# Patient Record
Sex: MALE | Race: WHITE | HISPANIC OR LATINO | Employment: FULL TIME | URBAN - METROPOLITAN AREA
[De-identification: names, ages, dates, MRNs, and addresses within clinical notes are randomized per-mention and may not be internally consistent; named-entity substitution may affect disease eponyms.]

---

## 2017-11-13 ENCOUNTER — OFFICE VISIT (OUTPATIENT)
Dept: MEDICAL GROUP | Facility: MEDICAL CENTER | Age: 37
End: 2017-11-13
Payer: COMMERCIAL

## 2017-11-13 VITALS
BODY MASS INDEX: 33.15 KG/M2 | TEMPERATURE: 97.8 F | DIASTOLIC BLOOD PRESSURE: 78 MMHG | WEIGHT: 218.7 LBS | OXYGEN SATURATION: 97 % | RESPIRATION RATE: 20 BRPM | SYSTOLIC BLOOD PRESSURE: 120 MMHG | HEIGHT: 68 IN | HEART RATE: 76 BPM

## 2017-11-13 DIAGNOSIS — F33.1 MODERATE EPISODE OF RECURRENT MAJOR DEPRESSIVE DISORDER (HCC): ICD-10-CM

## 2017-11-13 DIAGNOSIS — F40.248 FEAR OF PUBLIC SPEAKING: ICD-10-CM

## 2017-11-13 DIAGNOSIS — R41.840 INATTENTION: ICD-10-CM

## 2017-11-13 DIAGNOSIS — Z00.00 HEALTHCARE MAINTENANCE: ICD-10-CM

## 2017-11-13 DIAGNOSIS — Z23 NEED FOR INFLUENZA VACCINATION: ICD-10-CM

## 2017-11-13 DIAGNOSIS — Z23 NEED FOR VACCINATION: ICD-10-CM

## 2017-11-13 PROCEDURE — 90686 IIV4 VACC NO PRSV 0.5 ML IM: CPT | Performed by: FAMILY MEDICINE

## 2017-11-13 PROCEDURE — 90471 IMMUNIZATION ADMIN: CPT | Performed by: FAMILY MEDICINE

## 2017-11-13 PROCEDURE — 99204 OFFICE O/P NEW MOD 45 MIN: CPT | Mod: 25 | Performed by: FAMILY MEDICINE

## 2017-11-13 RX ORDER — SERTRALINE HYDROCHLORIDE 25 MG/1
25 TABLET, FILM COATED ORAL DAILY
COMMUNITY
End: 2017-11-13 | Stop reason: SDUPTHER

## 2017-11-13 RX ORDER — DEXTROAMPHETAMINE SACCHARATE, AMPHETAMINE ASPARTATE, DEXTROAMPHETAMINE SULFATE AND AMPHETAMINE SULFATE 2.5; 2.5; 2.5; 2.5 MG/1; MG/1; MG/1; MG/1
5 TABLET ORAL 2 TIMES DAILY
COMMUNITY
End: 2017-11-13

## 2017-11-13 ASSESSMENT — PATIENT HEALTH QUESTIONNAIRE - PHQ9
5. POOR APPETITE OR OVEREATING: 2 - MORE THAN HALF THE DAYS
SUM OF ALL RESPONSES TO PHQ QUESTIONS 1-9: 17
CLINICAL INTERPRETATION OF PHQ2 SCORE: 4

## 2017-11-13 NOTE — ASSESSMENT & PLAN NOTE
The patient does have a fear of public speaking. When he has to speak in public he gets a fast heart rate and anxiety. He is wondering if anything can help.

## 2017-11-13 NOTE — ASSESSMENT & PLAN NOTE
The patient has major depressive disorder. He has been maintained on Zoloft 25 mg daily for the past year. He states that this is helping but thinks he might benefit from an increased dose. He does have occasional passive thoughts that he would be be better off dead. He denies suicidal ideation to me today. He denies any history of serious suicidal ideation or suicide attempts.

## 2017-11-13 NOTE — PROGRESS NOTES
[facility]  Progress Note  New Patient    Subjective:   Tom Watson is a 36 y.o. male here today with a chief complaint of inattention. This is a new patient to me. The patient comes in alone. 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     Inattention  The patient describes symptoms of inattention, poor concentration, memory loss. He doesn't know if he had the symptoms as a child. He had tried Adderall in the past and he says this helped.    Healthcare maintenance  The patient declined lipid, glucose, HIV, gonorrhea, chlamydia, RPR screening today.     Flu vaccine: Given 11/13/17.   Tdap: says had done in past 10 years.    Moderate episode of recurrent major depressive disorder (CMS-HCC)  The patient has major depressive disorder. He has been maintained on Zoloft 25 mg daily for the past year. He states that this is helping but thinks he might benefit from an increased dose. He does have occasional passive thoughts that he would be be better off dead. He denies suicidal ideation to me today. He denies any history of serious suicidal ideation or suicide attempts.    Fear of public speaking  The patient does have a fear of public speaking. When he has to speak in public he gets a fast heart rate and anxiety. He is wondering if anything can help.      No current outpatient prescriptions on file prior to visit.     No current facility-administered medications on file prior to visit.        Past Medical History:   Diagnosis Date   • ADHD    • Depression        Allergies: Review of patient's allergies indicates no known allergies.    Surgical History:  has a past surgical history that includes appendectomy.    Family History: family history includes Hypertension in his mother.    Social History:  reports that he has never smoked. He has never used smokeless tobacco. He reports that he drinks about 1.8 oz of alcohol per week . He reports that he does not use drugs.    ROS:  "  Constitutional ROS: No unexplained fevers, sweats, or chills  Eye ROS: No eye pain, redness, discharge  Ear ROS: No ear pain  Mouth/Throat ROS: No sore throat  Neck ROS: No swollen glands  Pulmonary ROS: No shortness of breath  Cardiovascular ROS: No chest pain  Gastrointestinal ROS: No nausea, vomiting, diarrhea, or constipation  Musculoskeletal/Extremities ROS: No peripheral edema  Hematologic/Lymphatic ROS: No weight loss  Skin/Integumentary ROS: No evidence of rash  Neurologic ROS: No seizures  Psychiatric ROS: Positive for depression.       Objective:     Vitals:    11/13/17 1004   BP: 120/78   Pulse: 76   Resp: 20   Temp: 36.6 °C (97.8 °F)   SpO2: 97%   Weight: 99.2 kg (218 lb 11.1 oz)   Height: 1.727 m (5' 8\")       Physical Exam:  Constitutional: Alert, no distress.  Skin: Warm, dry, good turgor, no rashes in visible areas.  Eye: Equal, round and reactive, conjunctiva clear, lids normal.  ENMT: Lips without lesions, good dentition, oropharynx clear.  Neck: Trachea midline, no masses, no thyromegaly. No cervical or supraclavicular lymphadenopathy  Respiratory: Unlabored respiratory effort, lungs clear to auscultation, no wheezes, no ronchi.  Cardiovascular: Normal S1, S2, no murmur, no edema.  Abdomen: Soft, non-tender, no masses, no hepatosplenomegaly.  Psych: Alert and oriented, flat affect, depressed mood.     Depression Screen (PHQ-2/PHQ-9) 11/13/2017   PHQ-2 Total Score 4   PHQ-9 Total Score 17       Interpretation of PHQ-9 Total Score   Score Severity   1-4 No Depression   5-9 Mild Depression   10-14 Moderate Depression   15-19 Moderately Severe Depression   20-27 Severe Depression      Assessment and Plan:     1. Need for influenza vaccination  - INFLUENZA VACCINE QUAD INJ >3Y(PF)    2. Fear of public speaking  May consider beta blocker. I'm concerned about worsening his depression and so we will hold off for now and re-evaluate him in 1 month.     3. Moderate episode of recurrent major depressive " disorder (CMS-HCC)  No SI currently. Discussed need to go to ER with any thoughts of self-harm. Also provided suicide hotline number to patient. Will increase dose of Zoloft.   - sertraline (ZOLOFT) 50 MG Tab; Take 1 Tab by mouth every day.  Dispense: 90 Tab; Refill: 1  - REFERRAL TO PSYCHIATRY    4. Inattention  - REFERRAL TO PSYCHIATRY    5. Healthcare maintenance  - See HPI.         Followup: Return in about 4 weeks (around 12/11/2017), or if symptoms worsen or fail to improve.

## 2017-11-13 NOTE — LETTER
[facility]  Jarad Mesa M.D.  [address]  [place] [address]  Fax: [phone]   Authorization for Release/Disclosure of   Protected Health Information   Name: TOM AGUILERA : 1980 SSN: [id number]   Address: [address] [place] [address] [address]  [facility] [address] Phone:    [phone] (home)    I authorize the entity listed below to release/disclose the PHI below to:   [facility]/Jarad Mesa M.D. and Jarad Mesa M.D.   Provider or Entity Name:  Dr. Navi Casillas   Address   City, State, Zip   Phone:              [phone]    Fax:     Reason for request: continuity of care   Information to be released:    [  ] LAST COLONOSCOPY,  including any PATH REPORT and follow-up  [  ] LAST FIT/COLOGUARD RESULT [  ] LAST DEXA  [  ] LAST MAMMOGRAM  [  ] LAST PAP  [  ] LAST LABS [  ] RETINA EXAM REPORT  [  ] IMMUNIZATION RECORDS  [x  ] Release all info      [  ] Check here and initial the line next to each item to release ALL health information INCLUDING  _____ Care and treatment for drug and / or alcohol abuse  _____ HIV testing, infection status, or AIDS  _____ Genetic Testing    DATES OF SERVICE OR TIME PERIOD TO BE DISCLOSED: last 4 office visits____________  I understand and acknowledge that:  * This Authorization may be revoked at any time by you in writing, except if your health information has already been used or disclosed.  * Your health information that will be used or disclosed as a result of you signing this authorization could be re-disclosed by the recipient. If this occurs, your re-disclosed health information may no longer be protected by State or Federal laws.  * You may refuse to sign this Authorization. Your refusal will not affect your ability to obtain treatment.  * This Authorization becomes effective upon signing and will  on (date) __________.      If no date is indicated, this Authorization will  one (1) year from the signature date.    Name: Tom  Walter    Signature:   Date:     11/13/2017       PLEASE FAX REQUESTED RECORDS BACK TO: [phone]

## 2017-11-13 NOTE — ASSESSMENT & PLAN NOTE
The patient declined lipid, glucose, HIV, gonorrhea, chlamydia, RPR screening today.     Flu vaccine: Given 11/13/17.   Tdap: says had done in past 10 years.

## 2018-01-29 ENCOUNTER — OFFICE VISIT (OUTPATIENT)
Dept: BEHAVIORAL HEALTH | Facility: PHYSICIAN GROUP | Age: 38
End: 2018-01-29
Payer: COMMERCIAL

## 2018-01-29 DIAGNOSIS — F41.8 SITUATIONAL ANXIETY: ICD-10-CM

## 2018-01-29 DIAGNOSIS — F33.1 MODERATE EPISODE OF RECURRENT MAJOR DEPRESSIVE DISORDER (HCC): ICD-10-CM

## 2018-01-29 DIAGNOSIS — F90.0 ATTENTION DEFICIT HYPERACTIVITY DISORDER (ADHD), PREDOMINANTLY INATTENTIVE TYPE: ICD-10-CM

## 2018-01-29 PROBLEM — F41.1 GAD (GENERALIZED ANXIETY DISORDER): Status: ACTIVE | Noted: 2018-01-29

## 2018-01-29 PROBLEM — F90.9 ADHD: Status: ACTIVE | Noted: 2018-01-29

## 2018-01-29 PROBLEM — F40.10 SOCIAL ANXIETY DISORDER: Status: ACTIVE | Noted: 2018-01-29

## 2018-01-29 PROCEDURE — 99214 OFFICE O/P EST MOD 30 MIN: CPT | Performed by: STUDENT IN AN ORGANIZED HEALTH CARE EDUCATION/TRAINING PROGRAM

## 2018-01-29 RX ORDER — DEXTROAMPHETAMINE SACCHARATE, AMPHETAMINE ASPARTATE MONOHYDRATE, DEXTROAMPHETAMINE SULFATE AND AMPHETAMINE SULFATE 2.5; 2.5; 2.5; 2.5 MG/1; MG/1; MG/1; MG/1
10 CAPSULE, EXTENDED RELEASE ORAL EVERY MORNING
Qty: 30 CAP | Refills: 0 | Status: SHIPPED | OUTPATIENT
Start: 2018-01-29 | End: 2018-02-28

## 2018-01-29 RX ORDER — PROPRANOLOL HYDROCHLORIDE 10 MG/1
10 TABLET ORAL 2 TIMES DAILY
Qty: 60 TAB | Refills: 2 | Status: SHIPPED | OUTPATIENT
Start: 2018-01-29

## 2018-01-29 NOTE — PROGRESS NOTES
PSYCHIATRIC Evaluation:    Supervising Physician:     Dr. Chaya Dasilva    ID: 36 y/o  male with hx of adhd, anxiety, and depression, seen for new establishment visit this morning.    CURRENT PSYCHOTROPIC MEDS:  -zoloft 50mg PO QDaily for anxiety/depression    Chief Complaint: ADHD/Anxiety/Depression    HPI:     Says that he has been living in [facility] for about 1 year now, transplant from [facility], and needs to be established with psychiatry. Previously been treated for ADHD and Depression/Anxiety. Currently taking zoloft (sertraline) 50mg PO QDaily for his depression/anxiety over the last 2 years which has been significantly beneficial, says his mood is currently stable and anxiety is under control. Patient denies any recent panic attacks which he has had previous to zoloft. Says that he has not taken Adderall for about 1 year now since [place] [facility] because he has not been established with mental health- psychotropic medication currently filled by PCP ( report has been scanned in our system)     ADHD: Was seen and evaluated for ADHD symptoms with extensive review of prior history. Patient’s symptoms have been present prior to age of 12 years old with both hyperactivity and impulsivity along with inattentiveness both while in school and at home. Patient’s current symptoms as an adult have been present for >6 months: consist of failing to give close attention to details in both a work setting and home setting. Has difficulty sustaining attention for extended periods of time and has difficulty organizing tasks and activates. Patient has a long history of avoiding &/or being reluctant to tasks that require sustained mental effort such as preparing reports and reviewing tasks required for her work.Patient has difficulty with being unable to engage in leisurely activity with sustained mental effort/ distractibility in the workplace which has affected in both settings of occupational functioning and  home-life.       Psychiatric Review of Systems:current symptoms as reported by pt.  Depression:   None currently  Cindi: none  Anxiety/Panic Attacks-hx of situational anxiety and panic attacksb   PTSD symptom: none  Psychosis: none  ADHD: as stated above       Medical Review of Systems: as reported by pt. All systems reviewed. Only those found to be + are noted below. All others are negative.   Neurological:    TBIs: none   SZs: none   Strokes: none     Other medical symptoms:   Thyroid: none   Diabetes: none   Cardiovascular disease: none    Psychiatric Examination: observed phenomenon:    Musculoskeletal(abnormal movements, gait, etc): normal gait  Appearance: young male, looks stated age, appropriate questions and answers  Thoughts: linear, organized  Speech:RRR  Mood:          good  Affect:         Mood congruent  SI/HI:   Denies/denies  Attention/Alertness:   alert  Memory:    Grossly intact  Orientation:    To person, place, time, and situation intact  Fund of Knowledge:    Grossly intact  Insight/Judgement into symptoms: good/good        Past Psychiatric Hx:   -hx of adhd diagnosed in 2016  -denies hx of inpatient psychiatric hospitalizations  -denies hx of self injurious behaviors  -denies hx of suicidal attempts     PAST PSYCHOTROPIC MEDS:  zoloft prescribed since 2015   adderall ER 20mg/daily    Family Psychiatric Hx:  -none reported    Social Hx:  -born and raised in [place], moved to [facility] at 10 years old.   -3 brothers/2 sisters. Father passed away from [facility] when patient was 10 years old.   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 Only those findings of potential interest to psychiatry are noted  below:  Medical Conditions:     Past Medical History:   Diagnosis Date   • ADHD    • Depression      Allergies: Patient has no known allergies.  Medications (currently prescribed at [facility]):   No current outpatient prescriptions on file prior to visit.     No current facility-administered medications on file prior to visit.      Labs: none on file   ECG: none on file    Cranial Imaging: none on file      ASSESSMENT/PLAN:  38 y/o  male with hx of ADHD, depression, and anxiety, currently treated for mood/anxiety with sertraline (zoloft) 50mg daily, says this has been effective in controlling mood/anxiety, denies recent panic attacks. Trial adderall XR 10mg PO QDaily for adhd symptoms, will titrate as appropriate ( report does show patient was taking adderall 20mg Daily, but not recently in over 1 year as accurately reported by patient). Requesting for low dose anxiolytic for social anxiety, trial propranolol 10mg BID.     #MDD  #situational anxiety  #ADHD    -trial adderall ER 10mg PO BID  -cont.(sertraline)  zoloft 50mg PO QDaily  -trial propranolol 10 mg PO BID   -basic labs next visit  -f/u in 4 weeks, will discuss need for adderall titration at that time.

## 2018-03-19 ENCOUNTER — OFFICE VISIT (OUTPATIENT)
Dept: BEHAVIORAL HEALTH | Facility: PHYSICIAN GROUP | Age: 38
End: 2018-03-19
Payer: COMMERCIAL

## 2018-03-19 DIAGNOSIS — F90.0 ATTENTION DEFICIT HYPERACTIVITY DISORDER (ADHD), PREDOMINANTLY INATTENTIVE TYPE: Primary | ICD-10-CM

## 2018-03-19 DIAGNOSIS — F33.1 MODERATE EPISODE OF RECURRENT MAJOR DEPRESSIVE DISORDER (HCC): ICD-10-CM

## 2018-03-19 DIAGNOSIS — F41.8 SITUATIONAL ANXIETY: ICD-10-CM

## 2018-03-19 PROCEDURE — 99214 OFFICE O/P EST MOD 30 MIN: CPT | Performed by: STUDENT IN AN ORGANIZED HEALTH CARE EDUCATION/TRAINING PROGRAM

## 2018-03-19 RX ORDER — DEXTROAMPHETAMINE SACCHARATE, AMPHETAMINE ASPARTATE MONOHYDRATE, DEXTROAMPHETAMINE SULFATE AND AMPHETAMINE SULFATE 5; 5; 5; 5 MG/1; MG/1; MG/1; MG/1
20 CAPSULE, EXTENDED RELEASE ORAL EVERY MORNING
Qty: 30 CAP | Refills: 0 | Status: SHIPPED | OUTPATIENT
Start: 2018-04-16 | End: 2018-05-16

## 2018-03-19 RX ORDER — DEXTROAMPHETAMINE SACCHARATE, AMPHETAMINE ASPARTATE MONOHYDRATE, DEXTROAMPHETAMINE SULFATE AND AMPHETAMINE SULFATE 5; 5; 5; 5 MG/1; MG/1; MG/1; MG/1
20 CAPSULE, EXTENDED RELEASE ORAL EVERY MORNING
Qty: 30 CAP | Refills: 0 | Status: SHIPPED | OUTPATIENT
Start: 2018-05-14 | End: 2018-06-13

## 2018-03-19 RX ORDER — DEXTROAMPHETAMINE SACCHARATE, AMPHETAMINE ASPARTATE MONOHYDRATE, DEXTROAMPHETAMINE SULFATE AND AMPHETAMINE SULFATE 5; 5; 5; 5 MG/1; MG/1; MG/1; MG/1
20 CAPSULE, EXTENDED RELEASE ORAL EVERY MORNING
Qty: 30 CAP | Refills: 0 | Status: SHIPPED | OUTPATIENT
Start: 2018-03-19 | End: 2018-04-18

## 2018-03-19 NOTE — PROGRESS NOTES
[facility]  PSYCHIATRIC FOLLOW-UP NOTE    Name: Tom Watson  MRN: [id number]  : 1980  Age: 37 y.o.  Date of assessment: 3/19/2018  PCP: Jarad Mesa M.D.  Persons in attendance: patient  Total face-to-face time: 35 minutes    REASON FOR VISIT/CHIEF COMPLAINT (as stated by patient)  Tom Watson is a 37 y.o. male with hx of adhd, anxiety, and depression, previously seen by this writer on 18.    CURRENT PSYCHOTROPIC MEDS:  -zoloft 50mg po qdaily for anxiety/depression  -adderall XR 10mg PO QDaily      HISTORY OF PRESENT ILLNESS:    Says that since his last appointment his mood continues to remain stable and his anxiety is well controlled. Adderall has helped with his organization and focus but admits there may still be room for improvement. Says that on average it is usually effective for about 5 hours at a time but he has noticed improvement in his work and being able to complete tasks on time. Sleeping and eating without difficulty. Willing to titrate dose today, refill completed today. and f/u in 3 months.     PSYCHOSOCIAL CHANGES SINCE PREVIOUS CONTACT:  None reported    RESPONSE TO TREATMENT:  zoloft and adderall has been effective    MEDICATION SIDE EFFECTS:  None reported    REVIEW OF SYSTEMS:        Constitutional negative   Eyes negative   Ears/Nose/Mouth/Throat negative   Cardiovascular negative   Respiratory negative   Gastrointestinal negative   Genitourinary negative   Muscular negative   Integumentary negative   Neurological negative   Endocrine negative   Hematologic/Lymphatic negative       PSYCHIATRIC EXAMINATION/MENTAL STATUS  There were no vitals taken for this visit.  Participation: Active verbal participation  Grooming:Good  Orientation: Alert  Eye contact: Good  Behavior:Calm   Mood: Euthymic  Affect: Flexible  Thought process: Logical  Thought content:  Within normal limits  Speech: Rate within normal limits  Perception:  Within normal limits  Memory:  No  gross evidence of memory deficits  Insight: Good  Judgment: Good      Current risk:    Suicide: Low   Homicide: Low   Self-harm: Low  Relapse: Low  Other:   Crisis Safety Plan reviewed?Yes  If evidence of imminent risk is present, intervention/plan:    Medical Records/Labs/Diagnostic Tests Reviewed: none on file  Medical Records/Labs/Diagnostic Tests Ordered: none at this time.      ASSESSMENT AND PLAN:  38 y/o  male with hx of ADHD, depression, and anxiety, currently treated for mood/anxiety with sertraline (zoloft) 50mg daily, says this has been effective in controlling mood/anxiety, denies recent panic attacks. Trial adderall XR 10mg PO QDaily for adhd symptoms, will titrate as appropriate ( report does show patient was taking adderall 20mg Daily, but not recently in over 1 year as accurately reported by patient). Requesting for low dose anxiolytic for social anxiety, trial propranolol 10mg BID.      #MDD  #situational anxiety  #ADHD     -Titrate adderall XR 20mg PO qdaily  -cont.(sertraline)  zoloft 50mg PO QDaily  -cont. propranolol 10 mg PO BID PRN for situational anxiety, says he has not needed this medication since his last appointment.   -basic labs next visit  -f/u in 3 months        Dutch Hurtado M.D.

## 2018-05-22 DIAGNOSIS — F33.1 MODERATE EPISODE OF RECURRENT MAJOR DEPRESSIVE DISORDER (HCC): ICD-10-CM

## 2018-05-29 DIAGNOSIS — F33.1 MODERATE EPISODE OF RECURRENT MAJOR DEPRESSIVE DISORDER (HCC): ICD-10-CM

## 2018-06-18 ENCOUNTER — DOCUMENTATION (OUTPATIENT)
Dept: BEHAVIORAL HEALTH | Facility: PHYSICIAN GROUP | Age: 38
End: 2018-06-18

## 2018-06-18 ENCOUNTER — OFFICE VISIT (OUTPATIENT)
Dept: BEHAVIORAL HEALTH | Facility: PHYSICIAN GROUP | Age: 38
End: 2018-06-18
Payer: COMMERCIAL

## 2018-06-18 DIAGNOSIS — F90.0 ATTENTION DEFICIT HYPERACTIVITY DISORDER (ADHD), PREDOMINANTLY INATTENTIVE TYPE: ICD-10-CM

## 2018-06-18 DIAGNOSIS — F33.1 MODERATE EPISODE OF RECURRENT MAJOR DEPRESSIVE DISORDER (HCC): ICD-10-CM

## 2018-06-18 DIAGNOSIS — F41.8 SITUATIONAL ANXIETY: ICD-10-CM

## 2018-06-18 PROCEDURE — 99214 OFFICE O/P EST MOD 30 MIN: CPT | Performed by: STUDENT IN AN ORGANIZED HEALTH CARE EDUCATION/TRAINING PROGRAM

## 2018-06-18 RX ORDER — LISDEXAMFETAMINE DIMESYLATE CAPSULES 30 MG/1
30 CAPSULE ORAL EVERY MORNING
Qty: 30 CAP | Refills: 0 | Status: SHIPPED | OUTPATIENT
Start: 2018-08-13 | End: 2018-06-29 | Stop reason: SDUPTHER

## 2018-06-18 RX ORDER — ALPRAZOLAM 0.25 MG/1
0.25 TABLET ORAL NIGHTLY PRN
Qty: 8 TAB | Refills: 1 | Status: SHIPPED | OUTPATIENT
Start: 2018-06-18 | End: 2018-07-18

## 2018-06-18 RX ORDER — LISDEXAMFETAMINE DIMESYLATE CAPSULES 30 MG/1
30 CAPSULE ORAL EVERY MORNING
Qty: 30 CAP | Refills: 0 | Status: SHIPPED | OUTPATIENT
Start: 2018-07-16 | End: 2018-06-29 | Stop reason: SDUPTHER

## 2018-06-18 RX ORDER — LISDEXAMFETAMINE DIMESYLATE CAPSULES 30 MG/1
30 CAPSULE ORAL EVERY MORNING
Qty: 30 CAP | Refills: 0 | Status: SHIPPED | OUTPATIENT
Start: 2018-06-18 | End: 2018-06-29 | Stop reason: SDUPTHER

## 2018-06-18 NOTE — PROGRESS NOTES
[facility]  PSYCHIATRIC FOLLOW-UP NOTE    Name: Tom Watson  MRN: [id number]  : 1980  Age: 37 y.o.  Date of assessment: 18  PCP: Jarad Mesa M.D.  Persons in attendance: patient  Total face-to-face time: 35 minutes    REASON FOR VISIT/CHIEF COMPLAINT (as stated by patient)  Tom Watson is a 37 y.o. 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 Says that although this med works, would like to change to a different medication due to its side effects- has not done well with methylphenidate branch, so therefore willing to trial vyvanse at this time.      Also discussed having severe anxiety related to public speaking, says that propranolol has not been effective since last visit. Willing to trial low dose xanax PRN today.    Otherwise mood remains stable, will adjust meds and f/u in 3 months-transition of care discussed with patient.     PSYCHOSOCIAL CHANGES SINCE PREVIOUS CONTACT:  None reported    RESPONSE TO TREATMENT:  zoloft and adderall has been effective    MEDICATION SIDE EFFECTS:  None reported    REVIEW OF SYSTEMS:        Constitutional negative   Eyes negative   Ears/Nose/Mouth/Throat negative   Cardiovascular negative   Respiratory negative   Gastrointestinal negative   Genitourinary negative   Muscular negative   Integumentary negative   Neurological negative   Endocrine negative   Hematologic/Lymphatic negative       PSYCHIATRIC EXAMINATION/MENTAL STATUS  There were no vitals taken for this visit.  Participation: Active verbal  participation  Grooming:Good  Orientation: Alert  Eye contact: Good  Behavior:Calm   Mood: Euthymic  Affect: Flexible  Thought process: Logical  Thought content:  Within normal limits  Speech: Rate within normal limits  Perception:  Within normal limits  Memory:  No gross evidence of memory deficits  Insight: Good  Judgment: Good      Current risk:    Suicide: Low   Homicide: Low   Self-harm: Low  Relapse: Low  Other:   Crisis Safety Plan reviewed?Yes  If evidence of imminent risk is present, intervention/plan:    Medical Records/Labs/Diagnostic Tests Reviewed: none on file  Medical Records/Labs/Diagnostic Tests Ordered: none at this time.      ASSESSMENT AND PLAN:  38 y/o  male with hx of ADHD, depression, and anxiety, currently treated for mood/anxiety with sertraline (zoloft) 50mg daily, says this has been effective in controlling mood/anxiety, denies recent panic attacks. Trial vyvanse  for adhd symptoms, will titrate as appropriate (secondary to side effect from adderall causing redness with conjuntiva). Requesting for low dose anxiolytic for social anxiety, trial propranolol 10mg BID.      #MDD  #situational anxiety  #ADHD     -d/c adderall xr  -trial vyvanse 30mg PO QAM  -cont.(sertraline)  zoloft 50mg PO QDaily  -d/c propranolol- patient found this medication ineffective  -trial low dose xanax 0.25mg PO QDaily PRN for public speaking. #8 tabs with 1 refill.  -f/u in 3 months        Dutch Hurtado M.D.

## 2018-09-17 ENCOUNTER — OFFICE VISIT (OUTPATIENT)
Dept: BEHAVIORAL HEALTH | Facility: PHYSICIAN GROUP | Age: 38
End: 2018-09-17
Payer: COMMERCIAL

## 2018-09-17 VITALS
HEART RATE: 86 BPM | SYSTOLIC BLOOD PRESSURE: 146 MMHG | DIASTOLIC BLOOD PRESSURE: 102 MMHG | BODY MASS INDEX: 32.43 KG/M2 | WEIGHT: 214 LBS | HEIGHT: 68 IN

## 2018-09-17 DIAGNOSIS — F90.0 ADHD (ATTENTION DEFICIT HYPERACTIVITY DISORDER), INATTENTIVE TYPE: ICD-10-CM

## 2018-09-17 DIAGNOSIS — F41.8 SITUATIONAL ANXIETY: ICD-10-CM

## 2018-09-17 DIAGNOSIS — F33.41 MDD (MAJOR DEPRESSIVE DISORDER), RECURRENT, IN PARTIAL REMISSION (HCC): ICD-10-CM

## 2018-09-17 PROBLEM — F41.1 GAD (GENERALIZED ANXIETY DISORDER): Status: RESOLVED | Noted: 2018-01-29 | Resolved: 2018-09-17

## 2018-09-17 PROBLEM — F40.10 SOCIAL ANXIETY DISORDER: Status: RESOLVED | Noted: 2018-01-29 | Resolved: 2018-09-17

## 2018-09-17 PROCEDURE — 99214 OFFICE O/P EST MOD 30 MIN: CPT | Mod: GC | Performed by: PSYCHIATRY & NEUROLOGY

## 2018-09-17 RX ORDER — LISDEXAMFETAMINE DIMESYLATE CAPSULES 30 MG/1
30 CAPSULE ORAL EVERY MORNING
Qty: 30 CAP | Refills: 0 | Status: SHIPPED | OUTPATIENT
Start: 2018-11-23 | End: 2018-12-23

## 2018-09-17 RX ORDER — HYDROXYZINE 50 MG/1
TABLET, FILM COATED ORAL
Qty: 60 TAB | Refills: 1 | Status: SHIPPED | OUTPATIENT
Start: 2018-09-17

## 2018-09-17 RX ORDER — LISDEXAMFETAMINE DIMESYLATE CAPSULES 30 MG/1
30 CAPSULE ORAL EVERY MORNING
Qty: 30 CAP | Refills: 0 | Status: SHIPPED | OUTPATIENT
Start: 2018-10-26 | End: 2018-11-25

## 2018-09-17 RX ORDER — LISDEXAMFETAMINE DIMESYLATE CAPSULES 30 MG/1
30 CAPSULE ORAL EVERY MORNING
Qty: 30 CAP | Refills: 0 | Status: SHIPPED | OUTPATIENT
Start: 2018-09-27 | End: 2018-10-27

## 2018-09-17 ASSESSMENT — ENCOUNTER SYMPTOMS
DIARRHEA: 0
CONSTITUTIONAL NEGATIVE: 1
NAUSEA: 0
ORTHOPNEA: 0
VOMITING: 0
PALPITATIONS: 0
EYE REDNESS: 1

## 2018-09-17 NOTE — PROGRESS NOTES
"[facility]  PSYCHIATRIC FOLLOW-UP NOTE      REASON FOR VISIT/CHIEF COMPLAINT (as stated by patient)  Chief Complaint   Patient presents with   • ADHD     \"doing better of Vyvanse\"     Tom Watson (Antonio) is a 37 y.o. male with hx of adhd, anxiety, and depression, previously seen by Dr. Hurtado on 6/18/18        HISTORY OF PRESENT ILLNESS:    Patient goes by Luis.      Pt reports that he is doing better since starting the Vyvanse.  Energy, focus, and overall well  Being pt feels better.  Pt does note some feelings of aggression vs more direct over the past month, but is also been off his Zoloft for the past month.  Patient has not had any aggressive episodes, just notes that he has been more direct in emails her communications with his employees or pauses.  Feels like at times this is been beneficial.     Pt reports that he is inatentive when off of the medication.  Pt did not have homework at school and does not remember particularly struggling when younger, however, patient reports he was pretty smart, not push very hard but got good grades.  Pt reports prior to starting medication he couldn't get started on tasks, taking medication for past 4 years and seen a big improvement, pt used to dread paper work, pt would misplace keys often.  Patient reports he was severely struggling at work, became distracted and made careless mistakes in the past led to serious repercussions, has not had these behaviors or symptoms since being on a stimulant medication.    Mood is good.  Pt reports that he has not been depressed in the past, but has not been depressed over the past year.  He stopped taking his antidepressants as he ran out, would like to restart it as he noticed a little bit of increased anxiety and agitation since being off medications.    At previous visit patient reported that he was having significant redness of his eyes, which she attributed to the Adderall.  Patient was switched to " "Vyvanse.  Patient also felt trial of propanolol for anxiety related to public speaking, was given a trial of Xanax however, patient elected not to start this.          PAST PSYCHIATRIC HISTORY AND MEDS:  -zoloft for anxiety/depression -started about 4-5 years ago, reports previously having cycles of depression and anxiety, has seen improvement on the medication.  -adderall XR  -patient complained of significant redness in his eyes, which he attributed to the medications.  Reported doing well on the with methylphenidate branch  -Vyvanse - started in June 2018.      SOCIAL HISTORY AND CHANGES SINCE PREVIOUS CONTACT:  Pt manages the cleaning services at [employer], reports that work is going relatively well.  Did have some issues with surveys going to the wrong department, feels like some retaliation was happening about him.      REVIEW OF SYSTEMS:        Review of Systems   Constitutional: Negative.    Eyes: Positive for redness.   Cardiovascular: Negative for chest pain, palpitations, orthopnea and leg swelling.   Gastrointestinal: Negative for diarrhea, nausea and vomiting.             PSYCHIATRIC EXAMINATION/MENTAL STATUS  /102   Pulse 86   Ht 1.727 m (5' 8\")   Wt 97.1 kg (214 lb)   BMI 32.54 kg/m²   Participation: Active verbal participation  Grooming:Good  Orientation: Alert  Eye contact: Good  Behavior:Calm   Mood: \"pretty good, not overly happy or sad, angry but sometimes agitated.\"  Affect: Full range, euthymic  Thought process: Logical  Thought content:  Within normal limits, no suicidal or homicidal ideation.  Speech: Rate within normal limits  Perception:  Within normal limits  Memory:  No gross evidence of memory deficits  Insight: Good  Judgment: Good      Medical Records/Labs/Diagnostic Tests Reviewed: none on file      ASSESSMENT AND PLAN:  38 y/o  male with hx of ADHD, depression, and anxiety, currently treated for mood/anxiety with sertraline (zoloft) 50mg daily but has been off his " medication for the past month and noted an increase in agitation.  Vyvanse has been effective for his ADHD, feels like he is doing better, continues to have red eyes, currently trialing eyedrops and following up with PCP.  Patient is overall doing well, wants to again be on sertraline and is overall stable.  Has improved on Vyvanse and will follow up in 3 months.    #ADHD, inattentive type   #MDD, partial remission (still having irritability of meds).    #situational anxiety (public speaking)       -Continue Vyvanse 30mg PO QAM, prescription printed out and provided to patient, should last through December 23, 2018  -cont.(sertraline)  zoloft 50mg PO QDaily - pt would like to restart a medication, was out of medication and did not continue for past month, has felt more irritable but mood has been  Stable  -Pt has not taken Xanax for public speaking and doesn't want to take it, [facility] and will trial hydroxyzine  - Trial hydroxyzine 25-50 mg bid prn anxiety for public speaking.     -Checked the [facility], patient has been feeling his Vyvanse as scheduled from 1 provider.  He did receive his Xanax, but also received 2 day supply of triazolam, also 2 day supply of hydrocodone for a future dental procedure.    -Follow-up with primary care physician for blood pressure, patient reports he is established with PCP.    -f/u in 3 months        Chivo Hui M.D.

## 2018-09-21 ENCOUNTER — DOCUMENTATION (OUTPATIENT)
Dept: BEHAVIORAL HEALTH | Facility: PHYSICIAN GROUP | Age: 38
End: 2018-09-21